# Patient Record
Sex: MALE | Race: OTHER | HISPANIC OR LATINO
[De-identification: names, ages, dates, MRNs, and addresses within clinical notes are randomized per-mention and may not be internally consistent; named-entity substitution may affect disease eponyms.]

---

## 2021-11-18 PROBLEM — Z00.00 ENCOUNTER FOR PREVENTIVE HEALTH EXAMINATION: Status: ACTIVE | Noted: 2021-11-18

## 2021-11-30 ENCOUNTER — APPOINTMENT (OUTPATIENT)
Dept: SURGERY | Facility: CLINIC | Age: 86
End: 2021-11-30
Payer: MEDICARE

## 2021-11-30 PROCEDURE — 99203 OFFICE O/P NEW LOW 30 MIN: CPT

## 2021-12-10 ENCOUNTER — TRANSCRIPTION ENCOUNTER (OUTPATIENT)
Age: 86
End: 2021-12-10

## 2021-12-14 ENCOUNTER — APPOINTMENT (OUTPATIENT)
Dept: VASCULAR SURGERY | Facility: CLINIC | Age: 86
End: 2021-12-14
Payer: MEDICARE

## 2021-12-14 VITALS — HEART RATE: 73 BPM | SYSTOLIC BLOOD PRESSURE: 108 MMHG | DIASTOLIC BLOOD PRESSURE: 71 MMHG

## 2021-12-14 PROCEDURE — 99204 OFFICE O/P NEW MOD 45 MIN: CPT

## 2021-12-16 NOTE — ASSESSMENT
[FreeTextEntry1] : 86yoM w/PMHx HTN/HLD, BPH, hypothyroidism, referred by Dr. 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  Pt was seen by Cristal for a chronic R inguinal hernia and was sent for CT a/p w/IV contrast, incidentally noted to have a 3cm RCIAA and 2.2cm LCIAA.  Pt denies any abd pain/back pain/claudication, and states that his hernia symptoms are chronic.  He denies previous abd surgery/abd infections, and admits to previous smoking hx, quit 30y prior.\par \par Small, non-tender, reducible RIH noted on exam, no other significant findings noted.  Outside CT a/p reveals b/l CIAA w/R SANJEEV mural thrombus, L measuring 2.2cm, R measuring 3cm.  Will plan for EVAR/CIAA in cath lab in 1wk, will need to discuss w/PCP and swab pt for COVID 3d prior.\par \par I, Dr. Bull Brito, personally performed the evaluation and management (E/M) services for this new patient.  That E/M includes conducting the initial examination, assessing all conditions, and establishing the plan of care.  Today, ACP, Duke Cleaning, was here to observe my evaluation and management services for this patient to be followed going forward.

## 2021-12-16 NOTE — ADDENDUM
[FreeTextEntry1] : This note was written by Duke Hernandez, acting as a scribe for Dr. Bull Brito.  I, Dr. Bull Brito, have read and attest that all the information, medical decision-making, and discharge instructions within are true and accurate.\par \par I, Dr. Bull Brito, personally performed the evaluation and management (E/M) services for this new patient.  That E/M includes conducting the initial examination, assessing all conditions, and establishing the plan of care.  Today, my ACP, Duke Hernandez, was here to observe my evaluation and management services for this patient to be followed going forward.

## 2021-12-16 NOTE — PHYSICAL EXAM
[Normal Thyroid] : the thyroid was normal [Normal Breath Sounds] : Normal breath sounds [Normal Heart Sounds] : normal heart sounds [Normal Rate and Rhythm] : normal rate and rhythm [2+] : left 2+ [No HSM] : no hepatosplenomegaly [No Rash or Lesion] : No rash or lesion [Alert] : alert [Calm] : calm [JVD] : no jugular venous distention  [Carotid Bruits] : no carotid bruits [Right Carotid Bruit] : no bruit heard over the right carotid [Left Carotid Bruit] : no bruit heard over the left carotid [Ankle Swelling (On Exam)] : not present [Abdomen Masses] : No abdominal masses [Abdomen Tenderness] : ~T ~M No abdominal tenderness [Purpura] : no purpura  [Petechiae] : no petechiae [Skin Ulcer] : no ulcer [Skin Induration] : no induration [de-identified] : Well-nourished, NAD [de-identified] : FROM throughout, strength 5/5x4 [de-identified] : NC/AT, anicteric

## 2021-12-16 NOTE — DATA REVIEWED
[FreeTextEntry1] : CT a/p reveals b/l CIAA w/R SANJEEV mural thrombus, L measuring 2.2cm, R measuring 3cm

## 2021-12-16 NOTE — HISTORY OF PRESENT ILLNESS
[FreeTextEntry1] : 86yoM w/PMHx HTN/HLD, BPH, hypothyroidism, referred by Dr. 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  Pt was seen by Cristal for a chronic R inguinal hernia and was sent for CT a/p w/IV contrast, incidentally noted to have a 3cm RCIAA and 2.2cm LCIAA.  Pt denies any abd pain/back pain/claudication, and states that his hernia symptoms are not frequent.  He denies previous abd surgery/abd infections, and admits to previous smoking hx, quit 30y prior.\par \par Pt underwent PTCA x 2 years prior, cardiac monitoring performed regularly by Dr. Casillas.  Elective RIHR is tentatively scheduled for 01/05/2021 w/Dr. Bee.

## 2021-12-22 DIAGNOSIS — K40.90 UNILATERAL INGUINAL HERNIA, W/OUT OBSTRUCTION OR GANGRENE, NOT SPECIFIED AS RECURRENT: ICD-10-CM

## 2022-01-05 ENCOUNTER — APPOINTMENT (OUTPATIENT)
Dept: SURGERY | Facility: HOSPITAL | Age: 87
End: 2022-01-05

## 2022-01-13 RX ORDER — ASPIRIN 81 MG
81 TABLET, DELAYED RELEASE (ENTERIC COATED) ORAL
Refills: 0 | Status: ACTIVE | COMMUNITY

## 2022-01-13 RX ORDER — MEMANTINE HYDROCHLORIDE AND DONEPEZIL HYDROCHLORIDE 21; 10 MG/1; MG/1
21-10 CAPSULE ORAL
Refills: 0 | Status: ACTIVE | COMMUNITY

## 2022-01-13 RX ORDER — ALENDRONATE SODIUM 35 MG/1
35 TABLET ORAL
Refills: 0 | Status: ACTIVE | COMMUNITY

## 2022-01-13 RX ORDER — DEXLANSOPRAZOLE 60 MG/1
60 CAPSULE, DELAYED RELEASE ORAL
Refills: 0 | Status: ACTIVE | COMMUNITY

## 2022-01-13 RX ORDER — LEVOTHYROXINE SODIUM 75 UG/1
75 CAPSULE ORAL
Refills: 0 | Status: ACTIVE | COMMUNITY

## 2022-01-13 RX ORDER — LISINOPRIL 5 MG/1
5 TABLET ORAL
Refills: 0 | Status: ACTIVE | COMMUNITY

## 2022-01-13 RX ORDER — ATORVASTATIN CALCIUM 40 MG/1
40 TABLET, FILM COATED ORAL
Refills: 0 | Status: ACTIVE | COMMUNITY

## 2022-01-13 RX ORDER — METOPROLOL SUCCINATE 50 MG/1
50 TABLET, EXTENDED RELEASE ORAL
Refills: 0 | Status: ACTIVE | COMMUNITY

## 2022-01-13 RX ORDER — DOXAZOSIN 2 MG/1
2 TABLET ORAL
Refills: 0 | Status: ACTIVE | COMMUNITY

## 2022-01-23 ENCOUNTER — TRANSCRIPTION ENCOUNTER (OUTPATIENT)
Age: 87
End: 2022-01-23

## 2022-01-24 ENCOUNTER — INPATIENT (INPATIENT)
Facility: HOSPITAL | Age: 87
LOS: 0 days | Discharge: HOME | DRG: 272 | End: 2022-01-25
Attending: SURGERY | Admitting: SURGERY
Payer: MEDICARE

## 2022-01-24 VITALS
OXYGEN SATURATION: 100 % | RESPIRATION RATE: 14 BRPM | HEART RATE: 65 BPM | SYSTOLIC BLOOD PRESSURE: 152 MMHG | TEMPERATURE: 97 F | DIASTOLIC BLOOD PRESSURE: 72 MMHG

## 2022-01-24 LAB
ANION GAP SERPL CALC-SCNC: 9 MMOL/L — SIGNIFICANT CHANGE UP (ref 5–17)
BASOPHILS # BLD AUTO: 0.04 K/UL — SIGNIFICANT CHANGE UP (ref 0–0.2)
BASOPHILS NFR BLD AUTO: 0.7 % — SIGNIFICANT CHANGE UP (ref 0–2)
BUN SERPL-MCNC: 19 MG/DL — SIGNIFICANT CHANGE UP (ref 7–23)
CALCIUM SERPL-MCNC: 9.2 MG/DL — SIGNIFICANT CHANGE UP (ref 8.4–10.5)
CHLORIDE SERPL-SCNC: 109 MMOL/L — HIGH (ref 96–108)
CO2 SERPL-SCNC: 26 MMOL/L — SIGNIFICANT CHANGE UP (ref 22–31)
CREAT SERPL-MCNC: 1.18 MG/DL — SIGNIFICANT CHANGE UP (ref 0.5–1.3)
EOSINOPHIL # BLD AUTO: 0.29 K/UL — SIGNIFICANT CHANGE UP (ref 0–0.5)
EOSINOPHIL NFR BLD AUTO: 4.7 % — SIGNIFICANT CHANGE UP (ref 0–6)
GLUCOSE SERPL-MCNC: 90 MG/DL — SIGNIFICANT CHANGE UP (ref 70–99)
HCT VFR BLD CALC: 36.5 % — LOW (ref 39–50)
HGB BLD-MCNC: 12.2 G/DL — LOW (ref 13–17)
IMM GRANULOCYTES NFR BLD AUTO: 0.3 % — SIGNIFICANT CHANGE UP (ref 0–1.5)
LYMPHOCYTES # BLD AUTO: 1.18 K/UL — SIGNIFICANT CHANGE UP (ref 1–3.3)
LYMPHOCYTES # BLD AUTO: 19.3 % — SIGNIFICANT CHANGE UP (ref 13–44)
MAGNESIUM SERPL-MCNC: 2 MG/DL — SIGNIFICANT CHANGE UP (ref 1.6–2.6)
MCHC RBC-ENTMCNC: 31.7 PG — SIGNIFICANT CHANGE UP (ref 27–34)
MCHC RBC-ENTMCNC: 33.4 GM/DL — SIGNIFICANT CHANGE UP (ref 32–36)
MCV RBC AUTO: 94.8 FL — SIGNIFICANT CHANGE UP (ref 80–100)
MONOCYTES # BLD AUTO: 0.5 K/UL — SIGNIFICANT CHANGE UP (ref 0–0.9)
MONOCYTES NFR BLD AUTO: 8.2 % — SIGNIFICANT CHANGE UP (ref 2–14)
NEUTROPHILS # BLD AUTO: 4.08 K/UL — SIGNIFICANT CHANGE UP (ref 1.8–7.4)
NEUTROPHILS NFR BLD AUTO: 66.8 % — SIGNIFICANT CHANGE UP (ref 43–77)
NRBC # BLD: 0 /100 WBCS — SIGNIFICANT CHANGE UP (ref 0–0)
PHOSPHATE SERPL-MCNC: 3.8 MG/DL — SIGNIFICANT CHANGE UP (ref 2.5–4.5)
PLATELET # BLD AUTO: 196 K/UL — SIGNIFICANT CHANGE UP (ref 150–400)
POTASSIUM SERPL-MCNC: 4.2 MMOL/L — SIGNIFICANT CHANGE UP (ref 3.5–5.3)
POTASSIUM SERPL-SCNC: 4.2 MMOL/L — SIGNIFICANT CHANGE UP (ref 3.5–5.3)
RBC # BLD: 3.85 M/UL — LOW (ref 4.2–5.8)
RBC # FLD: 12.7 % — SIGNIFICANT CHANGE UP (ref 10.3–14.5)
SODIUM SERPL-SCNC: 144 MMOL/L — SIGNIFICANT CHANGE UP (ref 135–145)
WBC # BLD: 6.11 K/UL — SIGNIFICANT CHANGE UP (ref 3.8–10.5)
WBC # FLD AUTO: 6.11 K/UL — SIGNIFICANT CHANGE UP (ref 3.8–10.5)

## 2022-01-24 PROCEDURE — 34707 EVASC RPR ILIO-ILIAC NDGFT: CPT | Mod: GC

## 2022-01-24 PROCEDURE — 76937 US GUIDE VASCULAR ACCESS: CPT | Mod: 26,GC

## 2022-01-24 RX ORDER — HEPARIN SODIUM 5000 [USP'U]/ML
5000 INJECTION INTRAVENOUS; SUBCUTANEOUS EVERY 8 HOURS
Refills: 0 | Status: DISCONTINUED | OUTPATIENT
Start: 2022-01-24 | End: 2022-01-25

## 2022-01-24 RX ORDER — ACETAMINOPHEN 500 MG
325 TABLET ORAL EVERY 4 HOURS
Refills: 0 | Status: DISCONTINUED | OUTPATIENT
Start: 2022-01-24 | End: 2022-01-24

## 2022-01-24 RX ORDER — ALENDRONATE SODIUM 70 MG/1
1 TABLET ORAL
Qty: 0 | Refills: 0 | DISCHARGE

## 2022-01-24 RX ORDER — DEXLANSOPRAZOLE 30 MG/1
1 CAPSULE, DELAYED RELEASE ORAL
Qty: 0 | Refills: 0 | DISCHARGE

## 2022-01-24 RX ORDER — DOXAZOSIN MESYLATE 4 MG
1 TABLET ORAL
Qty: 0 | Refills: 0 | DISCHARGE

## 2022-01-24 RX ORDER — METOPROLOL TARTRATE 50 MG
50 TABLET ORAL DAILY
Refills: 0 | Status: DISCONTINUED | OUTPATIENT
Start: 2022-01-24 | End: 2022-01-25

## 2022-01-24 RX ORDER — CLOPIDOGREL BISULFATE 75 MG/1
75 TABLET, FILM COATED ORAL DAILY
Refills: 0 | Status: DISCONTINUED | OUTPATIENT
Start: 2022-01-24 | End: 2022-01-25

## 2022-01-24 RX ORDER — DOXAZOSIN MESYLATE 4 MG
2 TABLET ORAL AT BEDTIME
Refills: 0 | Status: DISCONTINUED | OUTPATIENT
Start: 2022-01-24 | End: 2022-01-25

## 2022-01-24 RX ORDER — LEVOTHYROXINE SODIUM 125 MCG
75 TABLET ORAL DAILY
Refills: 0 | Status: DISCONTINUED | OUTPATIENT
Start: 2022-01-24 | End: 2022-01-25

## 2022-01-24 RX ORDER — ATORVASTATIN CALCIUM 80 MG/1
1 TABLET, FILM COATED ORAL
Qty: 0 | Refills: 0 | DISCHARGE

## 2022-01-24 RX ORDER — ATORVASTATIN CALCIUM 80 MG/1
40 TABLET, FILM COATED ORAL AT BEDTIME
Refills: 0 | Status: DISCONTINUED | OUTPATIENT
Start: 2022-01-24 | End: 2022-01-25

## 2022-01-24 RX ORDER — ASPIRIN/CALCIUM CARB/MAGNESIUM 324 MG
1 TABLET ORAL
Qty: 0 | Refills: 0 | DISCHARGE

## 2022-01-24 RX ORDER — INFLUENZA VIRUS VACCINE 15; 15; 15; 15 UG/.5ML; UG/.5ML; UG/.5ML; UG/.5ML
0.7 SUSPENSION INTRAMUSCULAR ONCE
Refills: 0 | Status: DISCONTINUED | OUTPATIENT
Start: 2022-01-24 | End: 2022-01-25

## 2022-01-24 RX ORDER — ACETAMINOPHEN 500 MG
650 TABLET ORAL EVERY 6 HOURS
Refills: 0 | Status: DISCONTINUED | OUTPATIENT
Start: 2022-01-24 | End: 2022-01-24

## 2022-01-24 RX ORDER — SODIUM CHLORIDE 9 MG/ML
1000 INJECTION INTRAMUSCULAR; INTRAVENOUS; SUBCUTANEOUS
Refills: 0 | Status: DISCONTINUED | OUTPATIENT
Start: 2022-01-24 | End: 2022-01-25

## 2022-01-24 RX ORDER — CHLORHEXIDINE GLUCONATE 213 G/1000ML
1 SOLUTION TOPICAL ONCE
Refills: 0 | Status: DISCONTINUED | OUTPATIENT
Start: 2022-01-24 | End: 2022-01-25

## 2022-01-24 RX ORDER — LEVOTHYROXINE SODIUM 125 MCG
1 TABLET ORAL
Qty: 0 | Refills: 0 | DISCHARGE

## 2022-01-24 RX ORDER — LISINOPRIL 2.5 MG/1
1 TABLET ORAL
Qty: 0 | Refills: 0 | DISCHARGE

## 2022-01-24 RX ORDER — ACETAMINOPHEN 500 MG
1000 TABLET ORAL ONCE
Refills: 0 | Status: COMPLETED | OUTPATIENT
Start: 2022-01-24 | End: 2022-01-24

## 2022-01-24 RX ORDER — ACETAMINOPHEN 500 MG
650 TABLET ORAL EVERY 6 HOURS
Refills: 0 | Status: DISCONTINUED | OUTPATIENT
Start: 2022-01-24 | End: 2022-01-25

## 2022-01-24 RX ORDER — METOPROLOL TARTRATE 50 MG
1 TABLET ORAL
Qty: 0 | Refills: 0 | DISCHARGE

## 2022-01-24 RX ORDER — ASPIRIN/CALCIUM CARB/MAGNESIUM 324 MG
81 TABLET ORAL DAILY
Refills: 0 | Status: DISCONTINUED | OUTPATIENT
Start: 2022-01-24 | End: 2022-01-25

## 2022-01-24 RX ORDER — PANTOPRAZOLE SODIUM 20 MG/1
40 TABLET, DELAYED RELEASE ORAL
Refills: 0 | Status: DISCONTINUED | OUTPATIENT
Start: 2022-01-24 | End: 2022-01-25

## 2022-01-24 RX ADMIN — Medication 2 MILLIGRAM(S): at 22:56

## 2022-01-24 RX ADMIN — HEPARIN SODIUM 5000 UNIT(S): 5000 INJECTION INTRAVENOUS; SUBCUTANEOUS at 14:56

## 2022-01-24 RX ADMIN — Medication 400 MILLIGRAM(S): at 11:19

## 2022-01-24 RX ADMIN — SODIUM CHLORIDE 100 MILLILITER(S): 9 INJECTION INTRAMUSCULAR; INTRAVENOUS; SUBCUTANEOUS at 12:17

## 2022-01-24 RX ADMIN — CLOPIDOGREL BISULFATE 75 MILLIGRAM(S): 75 TABLET, FILM COATED ORAL at 11:18

## 2022-01-24 RX ADMIN — ATORVASTATIN CALCIUM 40 MILLIGRAM(S): 80 TABLET, FILM COATED ORAL at 22:49

## 2022-01-24 RX ADMIN — HEPARIN SODIUM 5000 UNIT(S): 5000 INJECTION INTRAVENOUS; SUBCUTANEOUS at 22:49

## 2022-01-24 RX ADMIN — Medication 1000 MILLIGRAM(S): at 12:17

## 2022-01-24 RX ADMIN — PANTOPRAZOLE SODIUM 40 MILLIGRAM(S): 20 TABLET, DELAYED RELEASE ORAL at 14:34

## 2022-01-24 RX ADMIN — Medication 75 MICROGRAM(S): at 14:33

## 2022-01-24 RX ADMIN — Medication 81 MILLIGRAM(S): at 11:18

## 2022-01-24 RX ADMIN — Medication 50 MILLIGRAM(S): at 15:39

## 2022-01-24 NOTE — H&P ADULT - ASSESSMENT
87 y/o M with a PMH of HTN, HLD, BPH, hypothyroidism, chronic R inguinal hernia, and previous 30 packyear smoking presenting today for elective repair for a 3cm R CIAA. He denies any abdominal pain, chest pain, SOB, claudication symptoms, fevers, chills. He is now s/p EVAR.    - Admit to Dr. Brito 5 Uris tele  - post op labs  - flat for 2 hours  - bed rest  - frequent pulse exams  - clear liquid diet

## 2022-01-24 NOTE — PATIENT PROFILE ADULT - FALL HARM RISK - HARM RISK INTERVENTIONS

## 2022-01-24 NOTE — BRIEF OPERATIVE NOTE - OPERATION/FINDINGS
Procedure: EVAR  Indication: R SANJEEV aneurysm  Description: Right CFA access obtained under ultrasound and fluoroscopic guidance. 8x79 VBX stent placed in R SANJEEV with good wall apposition and no endoleak seen on completion. Right groin perclosed with 1 proglide.  IVF: 450cc  Contrast: 70cc  EBL: 25cc  Pulses: Palpable pt, biphasic dp  Max Sheath: 8Fr

## 2022-01-24 NOTE — H&P ADULT - HISTORY OF PRESENT ILLNESS
Attending: FERNANDO Hartmann, 86y, Male  MRN:  [id number]    HPI:  85 y/o M with a PMH of HTN, HLD, BPH, hypothyroidism, chronic R inguinal hernia, and previous 30 packyear smoking presenting today for elective repair for a 3cm R CIAA. He denies any abdominal pain, chest pain, SOB, claudication symptoms, fevers, chills.            PAST MEDICAL & SURGICAL HISTORY:      Home Medications:  Lisinopril 5mg Daily  Metoprolol Succinate 50mg daily  Atorvastatin 40 mg Daily  Aspirin 81mg Daily  Levothyroxine 75 mcg daily  Doxasozin 2mg daily  Alendronate 35mg  Dexilant 60mg          PHYSICAL EXAM:  General: NAD, resting comfortably  Pulmonary: Nonlabored breathing, no respiratory distress  Cardiovascular: RRR  Abdominal: soft, NT, ND  Extremities: warm, well perfused, palpable PTs b/l, biphasic dps b/l    LABS:          CAPILLARY BLOOD GLUCOSE        CAPILLARY BLOOD GLUCOSE            Microbiology:        RADIOLOGY & ADDITIONAL STUDIES:

## 2022-01-24 NOTE — PACU DISCHARGE NOTE - COMMENTS
Verbal report given to OLIVERIO Kelly, v/s stable, pt. resting comfortably, voicing no complaints. surgical site to Rt. groin soft, clean and intact, all pulses Dopplerable, pt. to be Transferred to room [facility]-1-

## 2022-01-24 NOTE — PROGRESS NOTE ADULT - SUBJECTIVE AND OBJECTIVE BOX
Vascular Surgery Post-Op Note    Procedure: EVAR    Diagnosis/Indication: R SANJEEV Aneurysm    Surgeon: Alisha    S: Patient complaining of some mild pain in groin and lower back . Denies CP, SOB, fevers or chills    O:  T(C): 36.2 (01-24-22 @ 10:08), Max: 36.2 (01-24-22 @ 10:08)  T(F): 97.1 (01-24-22 @ 10:08), Max: 97.1 (01-24-22 @ 10:08)  HR: 68 (01-24-22 @ 10:45) (63 - 68)  BP: 157/83 (01-24-22 @ 10:45) (142/74 - 157/83)  RR: 12 (01-24-22 @ 10:45) (12 - 16)  SpO2: 99% (01-24-22 @ 10:45) (99% - 100%)  Wt(kg): --                        12.2   6.11  )-----------( 196      ( 24 Jan 2022 10:22 )             36.5     01-24    144  |  109<H>  |  19  ----------------------------<  90  4.2   |  26  |  1.18    Ca    9.2      24 Jan 2022 10:22  Phos  3.8     01-24  Mg     2.0     01-24        Gen: NAD, resting comfortably in bed  C/V: NSR  Pulm: Nonlabored breathing, no respiratory distress  Abd: soft, NT/ND  Groin:  Right groin access site soft, wound clean dry and intact with guaze an tegaderm  Extrem: WWP, no calf edema,       Pt is a 86 year old male who is now s/p EVAR for R CIAA    - Admit to Dr. Brito 5 Uris tele  - post op labs  - flat for 2 hours  - bed rest  - frequent pulse exams  - clear liquid die       Vascular Surgery Post-Op Note    Procedure: EVAR    Diagnosis/Indication: R SANJEEV Aneurysm    Surgeon: Alisha    S: Patient complaining of some mild pain in groin and lower back . Denies CP, SOB, fevers or chills    O:  T(C): 36.2 (01-24-22 @ 10:08), Max: 36.2 (01-24-22 @ 10:08)  T(F): 97.1 (01-24-22 @ 10:08), Max: 97.1 (01-24-22 @ 10:08)  HR: 68 (01-24-22 @ 10:45) (63 - 68)  BP: 157/83 (01-24-22 @ 10:45) (142/74 - 157/83)  RR: 12 (01-24-22 @ 10:45) (12 - 16)  SpO2: 99% (01-24-22 @ 10:45) (99% - 100%)  Wt(kg): --                        12.2   6.11  )-----------( 196      ( 24 Jan 2022 10:22 )             36.5     01-24    144  |  109<H>  |  19  ----------------------------<  90  4.2   |  26  |  1.18    Ca    9.2      24 Jan 2022 10:22  Phos  3.8     01-24  Mg     2.0     01-24        Gen: NAD, resting comfortably in bed  C/V: NSR  Pulm: Nonlabored breathing, no respiratory distress  Abd: soft, NT/ND  Groin:  Right groin access site soft, wound clean dry and intact with guaze an tegaderm  Extrem: WWP, no calf edema,   Pulses: Palpable pt, biphasic dp      Pt is a 86 year old male who is now s/p EVAR for R CIAA    - Admit to Dr. Brito 5 Uris tele  - post op labs  - flat for 2 hours  - bed rest  - frequent pulse exams  - clear liquid die       Vascular Surgery Post-Op Note    Procedure: EVAR    Diagnosis/Indication: R SANJEEV Aneurysm    Surgeon: Alisha    S: Patient complaining of some mild pain in groin and lower back . Denies CP, SOB, fevers or chills    O:  T(C): 36.2 (01-24-22 @ 10:08), Max: 36.2 (01-24-22 @ 10:08)  T(F): 97.1 (01-24-22 @ 10:08), Max: 97.1 (01-24-22 @ 10:08)  HR: 68 (01-24-22 @ 10:45) (63 - 68)  BP: 157/83 (01-24-22 @ 10:45) (142/74 - 157/83)  RR: 12 (01-24-22 @ 10:45) (12 - 16)  SpO2: 99% (01-24-22 @ 10:45) (99% - 100%)  Wt(kg): --                        12.2   6.11  )-----------( 196      ( 24 Jan 2022 10:22 )             36.5     01-24    144  |  109<H>  |  19  ----------------------------<  90  4.2   |  26  |  1.18    Ca    9.2      24 Jan 2022 10:22  Phos  3.8     01-24  Mg     2.0     01-24        Gen: NAD, resting comfortably in bed  C/V: NSR  Pulm: Nonlabored breathing, no respiratory distress  Abd: soft, NT/ND  Groin:  Right groin access site soft, wound clean dry and intact with guaze an tegaderm  Extrem: WWP, no calf edema,   Pulses: Palpable pt, biphasic dp       87 y/o M with a PMH of HTN, HLD, BPH, hypothyroidism, chronic R inguinal hernia, and previous 30 packyear smoking s/p EVAR for R CIAA    - Admit to Dr. Brito 5 Uris tele  - post op labs  - flat for 2 hours  - bed rest  - frequent pulse exams  - clear liquid die

## 2022-01-25 ENCOUNTER — TRANSCRIPTION ENCOUNTER (OUTPATIENT)
Age: 87
End: 2022-01-25

## 2022-01-25 VITALS
DIASTOLIC BLOOD PRESSURE: 60 MMHG | RESPIRATION RATE: 18 BRPM | OXYGEN SATURATION: 98 % | HEART RATE: 65 BPM | SYSTOLIC BLOOD PRESSURE: 115 MMHG

## 2022-01-25 PROCEDURE — C1887: CPT

## 2022-01-25 PROCEDURE — 83735 ASSAY OF MAGNESIUM: CPT

## 2022-01-25 PROCEDURE — C1876: CPT

## 2022-01-25 PROCEDURE — 85025 COMPLETE CBC W/AUTO DIFF WBC: CPT

## 2022-01-25 PROCEDURE — 86901 BLOOD TYPING SEROLOGIC RH(D): CPT

## 2022-01-25 PROCEDURE — 86900 BLOOD TYPING SEROLOGIC ABO: CPT

## 2022-01-25 PROCEDURE — 76000 FLUOROSCOPY <1 HR PHYS/QHP: CPT

## 2022-01-25 PROCEDURE — 86850 RBC ANTIBODY SCREEN: CPT

## 2022-01-25 PROCEDURE — C1725: CPT

## 2022-01-25 PROCEDURE — C1769: CPT

## 2022-01-25 PROCEDURE — 84100 ASSAY OF PHOSPHORUS: CPT

## 2022-01-25 PROCEDURE — C1894: CPT

## 2022-01-25 PROCEDURE — 36415 COLL VENOUS BLD VENIPUNCTURE: CPT

## 2022-01-25 PROCEDURE — C1760: CPT

## 2022-01-25 PROCEDURE — 80048 BASIC METABOLIC PNL TOTAL CA: CPT

## 2022-01-25 RX ORDER — CLOPIDOGREL BISULFATE 75 MG/1
1 TABLET, FILM COATED ORAL
Qty: 30 | Refills: 3
Start: 2022-01-25 | End: 2022-05-24

## 2022-01-25 RX ORDER — ACETAMINOPHEN 500 MG
2 TABLET ORAL
Qty: 0 | Refills: 0 | DISCHARGE
Start: 2022-01-25

## 2022-01-25 RX ADMIN — Medication 50 MILLIGRAM(S): at 05:21

## 2022-01-25 RX ADMIN — Medication 81 MILLIGRAM(S): at 13:29

## 2022-01-25 RX ADMIN — PANTOPRAZOLE SODIUM 40 MILLIGRAM(S): 20 TABLET, DELAYED RELEASE ORAL at 05:21

## 2022-01-25 RX ADMIN — HEPARIN SODIUM 5000 UNIT(S): 5000 INJECTION INTRAVENOUS; SUBCUTANEOUS at 05:21

## 2022-01-25 RX ADMIN — CLOPIDOGREL BISULFATE 75 MILLIGRAM(S): 75 TABLET, FILM COATED ORAL at 13:31

## 2022-01-25 RX ADMIN — Medication 75 MICROGRAM(S): at 05:21

## 2022-01-25 NOTE — DISCHARGE NOTE NURSING/CASE MANAGEMENT/SOCIAL WORK - NSDCPEFALRISK_GEN_ALL_CORE
For information on Fall & Injury Prevention, visit: [email or web address] OR  [email or web address] OR  https://www.cdc.gov/steadi/patient.html

## 2022-01-25 NOTE — DISCHARGE NOTE PROVIDER - PROVIDER TOKENS
PROVIDER:[TOKEN:[[id number]]] PROVIDER:[TOKEN:[[id number]],SCHEDULEDAPPT:[02/08/2022],SCHEDULEDAPPTTIME:[09:30 AM]]

## 2022-01-25 NOTE — DISCHARGE NOTE PROVIDER - NSDCCPCAREPLAN_GEN_ALL_CORE_FT
PRINCIPAL DISCHARGE DIAGNOSIS  Diagnosis: Aneurysm artery, iliac common  Assessment and Plan of Treatment:       SECONDARY DISCHARGE DIAGNOSES  Diagnosis: HTN (hypertension)  Assessment and Plan of Treatment:     Diagnosis: HLD (hyperlipidemia)  Assessment and Plan of Treatment:     Diagnosis: BPH (benign prostatic hyperplasia)  Assessment and Plan of Treatment:     Diagnosis: Hypothyroidism  Assessment and Plan of Treatment:     Diagnosis: Right inguinal hernia  Assessment and Plan of Treatment:

## 2022-01-25 NOTE — DISCHARGE NOTE PROVIDER - HOSPITAL COURSE
85 y/o M with a PMH of HTN, HLD, BPH, hypothyroidism, chronic R inguinal hernia, and previous 30 packyear smoking presenting today for elective repair for a 3cm R CIAA. He tolerated the procedure well and was transferred to the PACU for close observation. He remained hemodynamically stable and bilateral groins remained soft. He was transferred to the telemetry unit where his bailey was removed. He was able to void freely and tolerated his diet. He ambulated without difficulty. He will be discharged on Plavix which is a new medication.     **** Pause    87 y/o M with a PMH of HTN, HLD, BPH, hypothyroidism, chronic R inguinal hernia, and previous 30 packyear smoking presenting today for elective repair for a 3cm R CIAA. He tolerated the procedure well and was transferred to the PACU for close observation. He remained hemodynamically stable and bilateral groins remained soft. He was transferred to the telemetry unit where his bailey was removed. He was able to void freely and tolerated his diet. He ambulated without difficulty. He will be discharged on Plavix which is a new medication.

## 2022-01-25 NOTE — DISCHARGE NOTE PROVIDER - NSDCMRMEDTOKEN_GEN_ALL_CORE_FT
acetaminophen 325 mg oral tablet: 2 tab(s) orally every 6 hours, As needed, Mild Pain (1 - 3), Moderate Pain (4 - 6)  alendronate 35 mg oral tablet: 1 tab(s) orally once a week  aspirin 81 mg oral tablet, chewable: 1 tab(s) orally once a day  atorvastatin 40 mg oral tablet: 1 tab(s) orally once a day  Dexilant 60 mg oral delayed release capsule: 1 cap(s) orally once a day  doxazosin 2 mg oral tablet: 1 tab(s) orally once a day  levothyroxine 75 mcg (0.075 mg) oral tablet: 1 tab(s) orally once a day  lisinopril 5 mg oral tablet: 1 tab(s) orally once a day  metoprolol succinate 50 mg oral tablet, extended release: 1 tab(s) orally once a day   acetaminophen 325 mg oral tablet: 2 tab(s) orally every 6 hours, As needed, Mild Pain (1 - 3), Moderate Pain (4 - 6)  alendronate 35 mg oral tablet: 1 tab(s) orally once a week  aspirin 81 mg oral tablet, chewable: 1 tab(s) orally once a day  atorvastatin 40 mg oral tablet: 1 tab(s) orally once a day  clopidogrel 75 mg oral tablet: 1 tab(s) orally once a day  Dexilant 60 mg oral delayed release capsule: 1 cap(s) orally once a day  doxazosin 2 mg oral tablet: 1 tab(s) orally once a day  levothyroxine 75 mcg (0.075 mg) oral tablet: 1 tab(s) orally once a day  lisinopril 5 mg oral tablet: 1 tab(s) orally once a day  metoprolol succinate 50 mg oral tablet, extended release: 1 tab(s) orally once a day

## 2022-01-25 NOTE — DISCHARGE NOTE PROVIDER - NSDCCPTREATMENT_GEN_ALL_CORE_FT
PRINCIPAL PROCEDURE  Procedure: Repair, aneurysm, aortoiliac, endovascular  Findings and Treatment:

## 2022-01-25 NOTE — DISCHARGE NOTE NURSING/CASE MANAGEMENT/SOCIAL WORK - PATIENT PORTAL LINK FT
You can access the FollowMyHealth Patient Portal offered by [facility] by registering at the following website: [email or web address]. By joining [employer]’s FollowMyHealth portal, you will also be able to view your health information using other applications (apps) compatible with our system.

## 2022-01-25 NOTE — DISCHARGE NOTE PROVIDER - CARE PROVIDER_API CALL
Bull Brito)  Surgery; Vascular Surgery  [address], [place] [address]  Phone: [phone]  Fax: [phone]  Follow Up Time:    Bull Brito)  Surgery; Vascular Surgery  [address] [place] [address]  Phone: [phone]  Fax: [phone]  Scheduled Appointment: 02/08/2022 09:30 AM

## 2022-01-25 NOTE — DISCHARGE NOTE PROVIDER - CARE PROVIDERS DIRECT ADDRESSES
[email or web address]

## 2022-01-25 NOTE — PROGRESS NOTE ADULT - SUBJECTIVE AND OBJECTIVE BOX
O/N: GRISELDA groin soft, had 2sec pause - asympatmatic, EKG showed sinus arrhythmia and bradycardia (unchanged from preop)  1/24: admitted s/p FRANKY CARRENO, POC WNL post op labs/ekg jayda           ---------------------------------------------------------------------------  PLEASE CHECK WHEN PRESENT:  [  ] Heart Failure  [  ] Acute  [  ] Acute on Chronic  [  ] Chronic  -------------------------------------------------------------------  [  ]Diastolic [HFpEF]  [  ]Systolic [HFrEF]  [  ]Combined [HFpEF & HFrEF]  [  ] afib  [x] hypertensive heart disease  [  ]Other:  -------------------------------------------------------------------  [ ] Respiratory failure  [ ] Acute cor pulmonale  [ ] Asthma/COPD Exacerbation  [ ] Pleural effusion  [ ] Aspiration pneumonia  -------------------------------------------------------------------  [  ]JEFF  [  ]ATN  [  ]Reneal Medullary Necrosis  [  ]Renal Cortical Necrosis  [  ]Other Pathological Lesions:    [  ]CKD 1  [  ]CKD 2  [  ]CKD 3  [  ]CKD 4  [  ]CKD 5  [  ]Other  -------------------------------------------------------------------  [  ]Diabetes  [  ] Diabetic PVD Ulcer  [  ] Neuropathic ulcer to DM  [  ] Diabetes with Nephropathy  [  ] Osteomyelitis due to diabetes  --------------------------------------------------------------------  [  ]Malnutrition: See Nutrition Note  [  ]Cachexia  [  ]Other:   [  ]Supplement Ordered:  [  ]Morbid Obesity (BMI >=40]  ---------------------------------------------------------------------  [ ] Sepsis/severe sepsis/septic shock  [ ] UTI  [ ] Pneumonia  -----------------------------------------------------------------------  [ ] Acidosis/alkalosis  [ ] Fluid overload  [ ] Hypokalemia  [ ] Hyperkalemia  [ ] Hypomagnesemia  [ ] Hypophosphatemia  [ ] Hyperphosphatemia  ------------------------------------------------------------------------  [ ] Acute blood loss anemia  [ ] Post op blood loss anemia  [ ] Iron deficiency anemia  [ ] Anemia due to chronic disease  [ ] Hypercoagulable state  ----------------------------------------------------------------------  [ ] Cerebral infarction  [ ] Transient ischemia attack  [ ] Encephalopathy    A/P: 85 y/o M with a PMH of HTN, HLD, BPH, hypothyroidism, chronic R inguinal hernia, and previous 30 pack-year smoking presenting today for elective repair for a 3cm R CIAA. He denies any abdominal pain, chest pain, SOB, claudication symptoms, fevers, chills. He is now s/p R common iliac artery stent 1/24/22      Vascular/PAD:  -R SANJEEV aneurysm now s/p R common iliac artery stent 1/24/22  -Started on Plavix, c/w ASA  -Pain control    HTN/HLD:  -c/w home metorpolol, cardura   -c/w home atorvastatin    Hypothryoid  -c/w home levothyroxine    Diet:   CLD/IVF will advance this AM and HL IVF    Activity:   OOB on POD#1    DVTPPx: HSQ    Dispo: d/c home today   O/N: R groin soft, had 2sec pause - asympatmatic, EKG showed sinus arrhythmia and bradycardia (unchanged from preop)  1/24: admitted s/p YULIAAR R SANJEEVA, POC WNL post op labs/ekg okay         Subjective: Denies CP, SOB, Deies bilateral groin pain, swelling or bleeding. Denies foot weakness or numbness     ROS:   Denies Headache, blurred vision, Chest Pain, SOB, Abdominal pain, nausea or vomiting     Social   aspirin  chewable 81  clopidogrel Tablet 75  doxazosin 2  heparin   Injectable 5000  metoprolol succinate ER 50      Allergies    Allergy Status Unknown    Intolerances        Vital Signs Last 24 Hrs  T(C): 36.9 (25 Jan 2022 06:26), Max: 37.2 (24 Jan 2022 22:07)  T(F): 98.4 (25 Jan 2022 06:26), Max: 98.9 (24 Jan 2022 22:07)  HR: 59 (25 Jan 2022 05:17) (50 - 68)  BP: 111/56 (25 Jan 2022 05:17) (100/54 - 157/83)  BP(mean): 91 (24 Jan 2022 19:19) (91 - 113)  RR: 18 (25 Jan 2022 05:17) (12 - 20)  SpO2: 95% (25 Jan 2022 05:17) (95% - 100%)  I&O's Summary    24 Jan 2022 07:01  -  25 Jan 2022 07:00  --------------------------------------------------------  IN: 2540 mL / OUT: 1855 mL / NET: 685 mL        Physical Exam:  General: NAD  Pulmonary: b/l breath sounds   Cardiovascular: s1s2 REg  Abdominal: soft   b/l groins soft to touch. no hematoma or ecchymosis noted   Bilateral DP (Doppler tone PT (Palpable)         LABS:                        12.2   6.11  )-----------( 196      ( 24 Jan 2022 10:22 )             36.5     01-24    144  |  109<H>  |  19  ----------------------------<  90  4.2   |  26  |  1.18    Ca    9.2      24 Jan 2022 10:22  Phos  3.8     01-24  Mg     2.0     01-24          Radiology and Additional Studies:        ---------------------------------------------------------------------------  PLEASE CHECK WHEN PRESENT:  [  ] Heart Failure  [  ] Acute  [  ] Acute on Chronic  [  ] Chronic  -------------------------------------------------------------------  [  ]Diastolic [HFpEF]  [  ]Systolic [HFrEF]  [  ]Combined [HFpEF & HFrEF]  [  ] afib  [x] hypertensive heart disease  [  ]Other:  -------------------------------------------------------------------  [ ] Respiratory failure  [ ] Acute cor pulmonale  [ ] Asthma/COPD Exacerbation  [ ] Pleural effusion  [ ] Aspiration pneumonia  -------------------------------------------------------------------  [  ]JEFF  [  ]ATN  [  ]Reneal Medullary Necrosis  [  ]Renal Cortical Necrosis  [  ]Other Pathological Lesions:    [  ]CKD 1  [  ]CKD 2  [  ]CKD 3  [  ]CKD 4  [  ]CKD 5  [  ]Other  -------------------------------------------------------------------  [  ]Diabetes  [  ] Diabetic PVD Ulcer  [  ] Neuropathic ulcer to DM  [  ] Diabetes with Nephropathy  [  ] Osteomyelitis due to diabetes  --------------------------------------------------------------------  [  ]Malnutrition: See Nutrition Note  [  ]Cachexia  [  ]Other:   [  ]Supplement Ordered:  [  ]Morbid Obesity (BMI >=40]  ---------------------------------------------------------------------  [ ] Sepsis/severe sepsis/septic shock  [ ] UTI  [ ] Pneumonia  -----------------------------------------------------------------------  [ ] Acidosis/alkalosis  [ ] Fluid overload  [ ] Hypokalemia  [ ] Hyperkalemia  [ ] Hypomagnesemia  [ ] Hypophosphatemia  [ ] Hyperphosphatemia  ------------------------------------------------------------------------  [ ] Acute blood loss anemia  [ ] Post op blood loss anemia  [ ] Iron deficiency anemia  [ ] Anemia due to chronic disease  [ ] Hypercoagulable state  ----------------------------------------------------------------------  [ ] Cerebral infarction  [ ] Transient ischemia attack  [ ] Encephalopathy    A/P: 87 y/o M with a PMH of HTN, HLD, BPH, hypothyroidism, chronic R inguinal hernia, and previous 30 pack-year smoking presenting today for elective repair for a 3cm R CIAA. He denies any abdominal pain, chest pain, SOB, claudication symptoms, fevers, chills. He is now s/p R common iliac artery stent 1/24/22      Vascular/PAD:  -R SANJEEV aneurysm now s/p R common iliac artery stent 1/24/22  -Started on Plavix, c/w ASA  -Pain control    HTN/HLD:  -c/w home metorpolol, cardura   -c/w home atorvastatin  -2 second pause on Monitor, remains asymptomatic. Will discuss need for further evaluation     Hypothryoid  -c/w home levothyroxine    Diet:   -Dash Diet and HLIVF    Activity:   Ambulate with assistance     DVTPPx: HSQ    Dispo: d/c home today

## 2022-01-25 NOTE — DISCHARGE NOTE PROVIDER - DISCHARGE DIET
[de-identified] : 53 y/o presents for follow up and fasting labs.\par He has a history of hypertension, hyperlipidemia, chronic GERD as well as intermittent back pain with spasm.\par He has been doing generally well without any recent illness.  He has gained some weight over the past year.  He does not routinely check his blood pressure at home.\par He still has not gone for a colonoscopy.
DASH Diet

## 2022-01-25 NOTE — DISCHARGE NOTE PROVIDER - NSDCFUADDINST_GEN_ALL_CORE_FT
FOLLOW UP: Dr. Brito on _____. Call the office at  with any questions    WOUND CARE: You may shower; soap and water over access sites. Do not scrub. Pat dry when done.     ACTIVITY:Ambulate as tolerated, but no heavy lifting (>10lbs) or strenuous exercise    DIET:   You may resume regular diet. Call the office if you experience increasing pain, redness, swelling or drainage from incision sites/wounds, or temperature >101.4F.     NEW MEDICATIONS:  Plavix 75mg daily     ADDITIONAL FOLLOW UP AFTER DISCHARGE:  Please follow up with your primary care doctor in one week     DISCHARGE DESTINATION: Home  FOLLOW UP: Dr. Brito on 2/8/22 at 9:30 AM. Call the office at  with any questions    WOUND CARE: You may shower; soap and water over access sites. Do not scrub. Pat dry when done.     ACTIVITY: Ambulate as tolerated, but no heavy lifting (>10lbs) or strenuous exercise    DIET:   You may resume regular diet. Call the office if you experience increasing pain, redness, swelling or drainage from incision sites/wounds, or temperature >101.4F.     NEW MEDICATIONS:  Plavix 75mg daily     ADDITIONAL FOLLOW UP AFTER DISCHARGE:  Please follow up with your primary care doctor in one week     DISCHARGE DESTINATION: Home

## 2022-01-28 DIAGNOSIS — E03.9 HYPOTHYROIDISM, UNSPECIFIED: ICD-10-CM

## 2022-01-28 DIAGNOSIS — I72.3 ANEURYSM OF ILIAC ARTERY: ICD-10-CM

## 2022-01-28 DIAGNOSIS — N40.0 BENIGN PROSTATIC HYPERPLASIA WITHOUT LOWER URINARY TRACT SYMPTOMS: ICD-10-CM

## 2022-01-28 DIAGNOSIS — Z79.890 HORMONE REPLACEMENT THERAPY: ICD-10-CM

## 2022-01-28 DIAGNOSIS — Z79.82 LONG TERM (CURRENT) USE OF ASPIRIN: ICD-10-CM

## 2022-01-28 DIAGNOSIS — Z87.891 PERSONAL HISTORY OF NICOTINE DEPENDENCE: ICD-10-CM

## 2022-01-28 DIAGNOSIS — E78.5 HYPERLIPIDEMIA, UNSPECIFIED: ICD-10-CM

## 2022-01-28 DIAGNOSIS — I10 ESSENTIAL (PRIMARY) HYPERTENSION: ICD-10-CM

## 2022-02-08 ENCOUNTER — APPOINTMENT (OUTPATIENT)
Dept: VASCULAR SURGERY | Facility: CLINIC | Age: 87
End: 2022-02-08
Payer: MEDICARE

## 2022-02-08 VITALS — HEART RATE: 74 BPM | SYSTOLIC BLOOD PRESSURE: 139 MMHG | DIASTOLIC BLOOD PRESSURE: 70 MMHG

## 2022-02-08 PROCEDURE — 93978 VASCULAR STUDY: CPT

## 2022-02-08 PROCEDURE — 99024 POSTOP FOLLOW-UP VISIT: CPT

## 2022-02-14 NOTE — ADDENDUM
[FreeTextEntry1] : This note was written by Duke Hernandez, acting as a scribe for Dr. Bull Brito.  I, Dr. Bull Brito, have read and attest that all the information, medical decision-making, and discharge instructions within are true and accurate.\par \par I, Dr. Bull Brito, personally performed the evaluation and management (E/M) services for this established patient who presents today with (an) existing condition(s).  That E/M includes conducting the examination, assessing all conditions, and (re)establishing/reinforcing a plan of care.  Today, my ACP, Duke Hernandez, was here to observe my evaluation and management services for this condition to be followed going forward.

## 2022-02-14 NOTE — PHYSICAL EXAM
[Normal Thyroid] : the thyroid was normal [Normal Breath Sounds] : Normal breath sounds [Normal Heart Sounds] : normal heart sounds [Normal Rate and Rhythm] : normal rate and rhythm [2+] : left 2+ [No HSM] : no hepatosplenomegaly [No Rash or Lesion] : No rash or lesion [Alert] : alert [Calm] : calm [JVD] : no jugular venous distention  [Carotid Bruits] : no carotid bruits [Right Carotid Bruit] : no bruit heard over the right carotid [Left Carotid Bruit] : no bruit heard over the left carotid [Ankle Swelling (On Exam)] : not present [Abdomen Masses] : No abdominal masses [Abdomen Tenderness] : ~T ~M No abdominal tenderness [Purpura] : no purpura  [Petechiae] : no petechiae [Skin Ulcer] : no ulcer [Skin Induration] : no induration [de-identified] : Well-nourished, NAD [de-identified] : NC/AT, anicteric [FreeTextEntry1] : No pulsatile masses in groins b/l [de-identified] : FROM throughout, strength 5/5x4, ambulates well w/o difficulty [de-identified] : Healed R groin puncture site, no erythema

## 2022-02-14 NOTE — REASON FOR VISIT
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  Pt reported some groin pain post-op day #1, now fully resolved, endorses unencumbered walking.  He is compliant w/DAPT, no issues w/bleeding/bruising.

## 2022-02-14 NOTE — DISCUSSION/SUMMARY
[FreeTextEntry1] : 86yoM originally referred by cardiology for growth of a R CIAA on angio, now s/p R CIAA repair w/endograft, reutnring for POC.  Pt reported some groin pain post-op day #1, now fully resolved, endorses unencumbered walking.  He is compliant w/DAPT, no issues w/bleeding/bruising.\par \par R groin soft on exam w/palpable pulses, no pulsatile masses in the groins.  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  Recommended pt continue all medical therapy, and RTO in 6mos for surveillance duplex.

## 2022-08-18 ENCOUNTER — APPOINTMENT (OUTPATIENT)
Dept: VASCULAR SURGERY | Facility: CLINIC | Age: 87
End: 2022-08-18

## 2022-08-18 VITALS — HEART RATE: 76 BPM | DIASTOLIC BLOOD PRESSURE: 78 MMHG | SYSTOLIC BLOOD PRESSURE: 134 MMHG

## 2022-08-18 PROCEDURE — 99214 OFFICE O/P EST MOD 30 MIN: CPT

## 2022-08-18 PROCEDURE — 93978 VASCULAR STUDY: CPT

## 2022-08-18 NOTE — PHYSICAL EXAM
[Normal Thyroid] : the thyroid was normal [Normal Breath Sounds] : Normal breath sounds [Normal Heart Sounds] : normal heart sounds [Normal Rate and Rhythm] : normal rate and rhythm [2+] : left 2+ [No HSM] : no hepatosplenomegaly [No Rash or Lesion] : No rash or lesion [Alert] : alert [Calm] : calm [JVD] : no jugular venous distention  [Carotid Bruits] : no carotid bruits [Right Carotid Bruit] : no bruit heard over the right carotid [Left Carotid Bruit] : no bruit heard over the left carotid [Ankle Swelling (On Exam)] : not present [Abdomen Masses] : No abdominal masses [Abdomen Tenderness] : ~T ~M No abdominal tenderness [Purpura] : no purpura  [Petechiae] : no petechiae [Skin Ulcer] : no ulcer [Skin Induration] : no induration [de-identified] : Well-nourished, NAD [de-identified] : NC/AT, anicteric [FreeTextEntry1] : No pulsatile masses in groins b/l [de-identified] : FROM throughout, strength 5/5x4, ambulates well w/o difficulty

## 2022-08-18 NOTE — PROCEDURE
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

## 2022-08-18 NOTE — HISTORY OF PRESENT ILLNESS
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  He is compliant w/DAPT, no issues w/bleeding/bruising/claudication, and states that his R groin hernia is not-painful and does not bother him physically.

## 2022-08-18 NOTE — ASSESSMENT
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  He is compliant w/DAPT, no issues w/bleeding/bruising/claudication, and states that his R groin hernia is not-painful and does not bother him physically.\par \par B/L groins soft on exam w/palpable pulses, no pulsatile masses in the groins.  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  Recommended pt continue all medical therapy, and RTO in 6mos for surveillance duplex.

## 2023-02-21 ENCOUNTER — APPOINTMENT (OUTPATIENT)
Dept: VASCULAR SURGERY | Facility: CLINIC | Age: 88
End: 2023-02-21
Payer: MEDICARE

## 2023-02-21 VITALS — HEART RATE: 75 BPM | SYSTOLIC BLOOD PRESSURE: 121 MMHG | DIASTOLIC BLOOD PRESSURE: 71 MMHG

## 2023-02-21 DIAGNOSIS — I72.3 ANEURYSM OF ILIAC ARTERY: ICD-10-CM

## 2023-02-21 PROCEDURE — 93978 VASCULAR STUDY: CPT

## 2023-02-21 PROCEDURE — 99214 OFFICE O/P EST MOD 30 MIN: CPT

## 2023-02-21 NOTE — PROCEDURE
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

## 2023-02-21 NOTE — PHYSICAL EXAM
[JVD] : no jugular venous distention  [Normal Thyroid] : the thyroid was normal [Carotid Bruits] : no carotid bruits [Normal Breath Sounds] : Normal breath sounds [Normal Heart Sounds] : normal heart sounds [Normal Rate and Rhythm] : normal rate and rhythm [Right Carotid Bruit] : no bruit heard over the right carotid [Left Carotid Bruit] : no bruit heard over the left carotid [2+] : left 2+ [Ankle Swelling (On Exam)] : not present [Abdomen Masses] : No abdominal masses [Abdomen Tenderness] : ~T ~M No abdominal tenderness [No HSM] : no hepatosplenomegaly [No Rash or Lesion] : No rash or lesion [Purpura] : no purpura  [Petechiae] : no petechiae [Skin Ulcer] : no ulcer [Skin Induration] : no induration [Alert] : alert [Calm] : calm [de-identified] : Well-nourished, NAD [de-identified] : NC/AT, anicteric [FreeTextEntry1] : No pulsatile masses in groins b/l [de-identified] : FROM throughout, strength 5/5x4, ambulates well w/o difficulty

## 2023-02-21 NOTE — HISTORY OF PRESENT ILLNESS
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  He is compliant w/DAPT, no issues w/bleeding/bruising/claudication, and states that his R groin hernia is not-painful and does not bother him physically.

## 2023-08-16 NOTE — ASSESSMENT
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  He is compliant w/DAPT, no issues w/bleeding/bruising/claudication, and states that his R groin hernia is not-painful and does not bother him physically.  Pt is ambulating well w/o issue.  B/L groins soft on exam w/palpable pulses, no pulsatile masses in the groins.  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  Recommended pt continue all medical therapy, and RTO in 6mos for surveillance duplex.

## 2023-08-16 NOTE — HISTORY OF PRESENT ILLNESS
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  He is compliant w/DAPT, no issues w/bleeding/bruising/claudication, and states that his R groin hernia is not-painful and does not bother him physically.  Pt is ambulating well w/o issue.

## 2023-08-16 NOTE — PHYSICAL EXAM
[JVD] : no jugular venous distention  [Normal Thyroid] : the thyroid was normal [Carotid Bruits] : no carotid bruits [Normal Breath Sounds] : Normal breath sounds [Normal Heart Sounds] : normal heart sounds [Normal Rate and Rhythm] : normal rate and rhythm [Right Carotid Bruit] : no bruit heard over the right carotid [Left Carotid Bruit] : no bruit heard over the left carotid [2+] : left 2+ [Ankle Swelling (On Exam)] : not present [Abdomen Masses] : No abdominal masses [Abdomen Tenderness] : ~T ~M No abdominal tenderness [No HSM] : no hepatosplenomegaly [No Rash or Lesion] : No rash or lesion [Purpura] : no purpura  [Petechiae] : no petechiae [Skin Ulcer] : no ulcer [Skin Induration] : no induration [Alert] : alert [Calm] : calm [de-identified] : Well-nourished, NAD [de-identified] : NC/AT, anicteric [FreeTextEntry1] : No pulsatile masses in groins b/l [de-identified] : FROM throughout, strength 5/5x4, ambulates well w/o difficulty

## 2023-09-19 ENCOUNTER — APPOINTMENT (OUTPATIENT)
Dept: VASCULAR SURGERY | Facility: CLINIC | Age: 88
End: 2023-09-19
Payer: MEDICARE

## 2023-09-19 VITALS — SYSTOLIC BLOOD PRESSURE: 146 MMHG | HEART RATE: 67 BPM | DIASTOLIC BLOOD PRESSURE: 68 MMHG

## 2023-09-19 PROCEDURE — 93978 VASCULAR STUDY: CPT

## 2023-09-19 PROCEDURE — 99213 OFFICE O/P EST LOW 20 MIN: CPT | Mod: 25

## 2024-09-17 ENCOUNTER — APPOINTMENT (OUTPATIENT)
Dept: VASCULAR SURGERY | Facility: CLINIC | Age: 89
End: 2024-09-17

## 2024-09-17 PROCEDURE — 99213 OFFICE O/P EST LOW 20 MIN: CPT

## 2024-09-23 NOTE — ADDENDUM
[FreeTextEntry1] :  This note was written by Kari CARUSO, acting as a scribe for Dr. Bull Brito.  I, Dr. Bull Brito, have read and attest that all the information, medical decision-making, and discharge instructions within are true and accurate.  I, Dr. Bull Brito, personally performed the evaluation and management (E/M) services for this established patient who presents today with (an) existing condition(s).  That E/M includes conducting the examination, assessing all conditions, and (re)establishing/reinforcing a plan of care.  Today, my ACP, Kari CARUSO, was here to observe my evaluation and management services for this condition to be followed going forward.

## 2024-09-23 NOTE — PHYSICAL EXAM
[Normal Thyroid] : the thyroid was normal [Normal Breath Sounds] : Normal breath sounds [Normal Heart Sounds] : normal heart sounds [Normal Rate and Rhythm] : normal rate and rhythm [2+] : left 2+ [No HSM] : no hepatosplenomegaly [No Rash or Lesion] : No rash or lesion [Alert] : alert [Calm] : calm [JVD] : no jugular venous distention  [Carotid Bruits] : no carotid bruits [Right Carotid Bruit] : no bruit heard over the right carotid [Left Carotid Bruit] : no bruit heard over the left carotid [Ankle Swelling (On Exam)] : not present [Abdomen Masses] : No abdominal masses [Abdomen Tenderness] : ~T ~M No abdominal tenderness [Purpura] : no purpura  [Petechiae] : no petechiae [Skin Ulcer] : no ulcer [Skin Induration] : no induration [de-identified] : Well-nourished, NAD [de-identified] : NC/AT, anicteric [FreeTextEntry1] : No pulsatile masses in groins b/l [de-identified] : FROM throughout, strength 5/5x4, ambulates well w/o difficulty

## 2024-09-23 NOTE — HISTORY OF PRESENT ILLNESS
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  He is compliant w/DAPT, no issues w/bleeding/bruising/claudication, and states that his R groin hernia is not-painful and does not bother him physically.  Pt is ambulating well w/o issue.

## 2024-09-23 NOTE — ASSESSMENT
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  Normal vascular exam noted today w/good distal perfusion in the legs.  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  Recommended pt continue all medical therapy, and RTO in 1 year for surveillance duplex.

## 2024-09-23 NOTE — PHYSICAL EXAM
[Normal Thyroid] : the thyroid was normal [Normal Breath Sounds] : Normal breath sounds [Normal Heart Sounds] : normal heart sounds [Normal Rate and Rhythm] : normal rate and rhythm [2+] : left 2+ [No HSM] : no hepatosplenomegaly [No Rash or Lesion] : No rash or lesion [Alert] : alert [Calm] : calm [JVD] : no jugular venous distention  [Carotid Bruits] : no carotid bruits [Right Carotid Bruit] : no bruit heard over the right carotid [Left Carotid Bruit] : no bruit heard over the left carotid [Ankle Swelling (On Exam)] : not present [Abdomen Masses] : No abdominal masses [Abdomen Tenderness] : ~T ~M No abdominal tenderness [Purpura] : no purpura  [Petechiae] : no petechiae [Skin Ulcer] : no ulcer [Skin Induration] : no induration [de-identified] : Well-nourished, NAD [de-identified] : NC/AT, anicteric [FreeTextEntry1] : No pulsatile masses in groins b/l [de-identified] : FROM throughout, strength 5/5x4, ambulates well w/o difficulty

## 2024-09-23 NOTE — PROCEDURE
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